# Patient Record
(demographics unavailable — no encounter records)

---

## 2018-11-07 NOTE — ULT
PELVIC ULTRASOUND WITH DOPPLER

(TRANSABDOMINAL, TRANSVAGINAL, GRAY SCALE, COLOR FLOW AND SPECTRAL DOPPLER)

11/7/18

 

HISTORY: 

Left pelvic pain. 

 

FINDINGS:  

The uterus measures 10.2 x 3.9 x 5.2 cm without focal mass or endometrial fluid. The endometrium adam
ures 5 mm in thickness. 

 

The right ovary measures 2.9 x 1.3 x 1.7 cm. The left ovary measures 2.5 x 1.2 x 2.1 cm. No adnexal m
asses seen on either side. Flow is demonstrated to both ovaries. No free fluid is seen in cul-de-sac.
 

 

There are prominent vessels adjacent to around the cervix and left ovary. 

 

IMPRESSION:  

Prominent vessels adjacent to the cervix and left ovary. Otherwise unremarkable exam. 

 

 

 

POS: [facility]